# Patient Record
Sex: FEMALE | Race: BLACK OR AFRICAN AMERICAN
[De-identification: names, ages, dates, MRNs, and addresses within clinical notes are randomized per-mention and may not be internally consistent; named-entity substitution may affect disease eponyms.]

---

## 2017-08-27 NOTE — PREOPHP
DATE OF ADMISSION:  2017

 

This patient is coming for a procedure on 2017.

 

HISTORY OF PRESENT ILLNESS:  This is a 57-year-old female,

 2, para 1.  This patient had seen me due to an abnormal

Pap smear that has continuously been abnormal, with the need for

a colposcopy.  The last Pap smear showed ASCUS, atypical squamous

cells of undetermined origin and HPV-positive.  This patient had

been scheduled for a D and C due to endometrial hyperplasia.

This patient has suffered from diabetes and hypertension.  She

has been uncontrolled.  The fact that the ultrasound showed

endometrial hyperplasia is advising me to perform a D and C.  The

endometrium was with no endometrial fluid.  Both ovaries were not

seen due to gas, and the uterus was heterogeneous, with no

measurable fibroids.  She also had a mild infiltration of the

liver with fat.

 

She was diagnosed with an endometrial polyp, and for this reason,

she is undergoing a D and C and LEEP conization.  This patient

has a history of being afraid of procedures, and a LEEP

conization is going to be performed since the need of a D and C

was present, due to postmenopausal bleeding and severe cervical

inflammation.

 

PAST MEDICAL HISTORY:  For tubal ligation, history of asthma,

diabetes, uncontrolled hypertension.

 

ALLERGIES:  SHE IS ALLERGIC TO CODEINE.

 

SHE HAS NO OTHER PERTINENT HISTORY.

 

MEDICATIONS:  She is on:

 

1.   Ibuprofen.

2.   High blood pressure medication.

3.   Dulera.

4.   Albuterol.

5.   Hydrochlorothiazide.

6.   Atorvastatin.

7.   Ventolin.

8.   Sertraline.

9.   __________.

10.  Fluticasone.

 

The patient also has been diagnosed as being bipolar.

 

PHYSICAL EXAMINATION:

VITAL SIGNS:  She weighs 214.  She is 5 feet, 5 inches.  Blood

pressure is 120/80, pulse is 80.

HEAD/NECK:  Normal.

BREASTS:  Soft, nontender, no masses.

CHEST:  Clear.

HEART:  Normal sinus rhythm.

LUNGS:  Normal.

ABDOMEN:  Soft, nontender, no masses.

GENITALIA:  Normal external genitalia.  Cervix with cervicitis.

Uterus normal size, mobile.  Adnexa negative.

RECTAL EXAMINATION:  Normal.

EXTREMITIES:  Normal.

 

DIAGNOSES:

1.   Atypical squamous cells of undetermined significance,

  nonsatisfactory colposcopy, due to severe inflammation and due to

  scar tissue on the cervix, with poor visualization of the

  transformation zone.  She is undergoing a LEEP conization and a

  dilatation and curettage.

2.   She also has diagnoses of diabetes, hypertension, asthma,

chronic smoking and bipolar disease.

 

PLAN:  She has been advised of the possible risks and possible

complications of the procedure with her alternatives and options.

Written information was provided.  She had no more questions and

agreed to go ahead with the procedure, with full understanding

and no more questions, the procedure to be D and C and LEEP

conization.

 

 

 

Dictated By:  Sola Galicia MD

 

/lane/audrey

Job#:  [id number]/Document#:  [id number]

D:  2017 22:17

T:  2017 22:59

## 2017-08-28 ENCOUNTER — HOSPITAL ENCOUNTER (OUTPATIENT)
Dept: HOSPITAL 10 - SDS | Age: 57
Discharge: HOME | End: 2017-08-28
Attending: OBSTETRICS & GYNECOLOGY
Payer: COMMERCIAL

## 2017-08-28 VITALS — HEART RATE: 74 BPM | DIASTOLIC BLOOD PRESSURE: 85 MMHG | RESPIRATION RATE: 28 BRPM | SYSTOLIC BLOOD PRESSURE: 126 MMHG

## 2017-08-28 VITALS — RESPIRATION RATE: 16 BRPM | SYSTOLIC BLOOD PRESSURE: 165 MMHG | HEART RATE: 86 BPM | DIASTOLIC BLOOD PRESSURE: 87 MMHG

## 2017-08-28 VITALS — DIASTOLIC BLOOD PRESSURE: 81 MMHG | SYSTOLIC BLOOD PRESSURE: 151 MMHG | RESPIRATION RATE: 26 BRPM | HEART RATE: 72 BPM

## 2017-08-28 VITALS — SYSTOLIC BLOOD PRESSURE: 152 MMHG | RESPIRATION RATE: 27 BRPM | DIASTOLIC BLOOD PRESSURE: 105 MMHG | HEART RATE: 82 BPM

## 2017-08-28 VITALS — HEART RATE: 74 BPM | RESPIRATION RATE: 16 BRPM | SYSTOLIC BLOOD PRESSURE: 132 MMHG | DIASTOLIC BLOOD PRESSURE: 78 MMHG

## 2017-08-28 VITALS — RESPIRATION RATE: 25 BRPM | HEART RATE: 88 BPM | SYSTOLIC BLOOD PRESSURE: 152 MMHG | DIASTOLIC BLOOD PRESSURE: 70 MMHG

## 2017-08-28 VITALS
HEIGHT: 65 IN | HEIGHT: 65 IN | WEIGHT: 205.47 LBS | BODY MASS INDEX: 34.23 KG/M2 | BODY MASS INDEX: 34.23 KG/M2 | WEIGHT: 205.47 LBS | BODY MASS INDEX: 34.23 KG/M2

## 2017-08-28 VITALS — SYSTOLIC BLOOD PRESSURE: 120 MMHG | RESPIRATION RATE: 18 BRPM | DIASTOLIC BLOOD PRESSURE: 73 MMHG | HEART RATE: 74 BPM

## 2017-08-28 VITALS — HEART RATE: 75 BPM | DIASTOLIC BLOOD PRESSURE: 80 MMHG | SYSTOLIC BLOOD PRESSURE: 109 MMHG | RESPIRATION RATE: 18 BRPM

## 2017-08-28 VITALS — HEART RATE: 72 BPM | DIASTOLIC BLOOD PRESSURE: 82 MMHG | SYSTOLIC BLOOD PRESSURE: 128 MMHG | RESPIRATION RATE: 15 BRPM

## 2017-08-28 VITALS — RESPIRATION RATE: 20 BRPM | SYSTOLIC BLOOD PRESSURE: 140 MMHG | DIASTOLIC BLOOD PRESSURE: 90 MMHG | HEART RATE: 70 BPM

## 2017-08-28 VITALS — HEART RATE: 76 BPM | DIASTOLIC BLOOD PRESSURE: 79 MMHG | SYSTOLIC BLOOD PRESSURE: 133 MMHG | RESPIRATION RATE: 19 BRPM

## 2017-08-28 VITALS — RESPIRATION RATE: 27 BRPM | HEART RATE: 84 BPM | DIASTOLIC BLOOD PRESSURE: 76 MMHG | SYSTOLIC BLOOD PRESSURE: 136 MMHG

## 2017-08-28 DIAGNOSIS — I10: ICD-10-CM

## 2017-08-28 DIAGNOSIS — E11.9: ICD-10-CM

## 2017-08-28 DIAGNOSIS — N71.9: ICD-10-CM

## 2017-08-28 DIAGNOSIS — J45.909: ICD-10-CM

## 2017-08-28 DIAGNOSIS — N88.8: Primary | ICD-10-CM

## 2017-08-28 PROCEDURE — 88305 TISSUE EXAM BY PATHOLOGIST: CPT

## 2017-08-28 PROCEDURE — 82962 GLUCOSE BLOOD TEST: CPT

## 2017-08-28 PROCEDURE — 57522 CONIZATION OF CERVIX: CPT

## 2017-08-28 RX ADMIN — SODIUM CHLORIDE PRN MCG: 9 INJECTION, SOLUTION INTRAVENOUS at 09:18

## 2017-08-28 RX ADMIN — SODIUM CHLORIDE PRN MCG: 9 INJECTION, SOLUTION INTRAVENOUS at 09:23

## 2017-08-28 NOTE — OPR
Date/Time of Note


Date/Time of Note


DATE: 8/28/17 


TIME: 08:48





Operative Report


Procedure Date:  Aug 28, 2017


Preoperative Diagnosis


ASCUS


ABNORMAL PAP SMEAR


NON SATISFACTORY COLPOSCOPY


DIABETES


HTN


ASTHMA


POSTMENOPAUSAL BLEEDING


Postoperative Diagnosis


SAME


Operation Performed


CERVICAL LEEP PROCEDURE


FRACTIONAL D&C


Surgeon:  DEMAR SAVAGE MD


Anesthesia Type:  general


Anesthesiologist:  KALI URIAS MD


Estimated Blood Loss:  none


Transfusion Required:  no


Specimens


ENDOMETRIAL CONTENTS


CERVICAL ECTO AND ENDOCERVICAL BIOPSIES


Grafts/Implants:  none


Complications:  no


Pt Condition Post Procedure:  stable


Disposition:  PACU











DEMAR SAVAGE MD Aug 28, 2017 08:52

## 2017-08-28 NOTE — OPR
DATE OF OPERATION:  08/28/2017

 

PROCEDURE:  Fractional D and C, and LEEP conization of the

cervix.

 

PREOPERATIVE DIAGNOSES:

1.   Nonsatisfactory colposcopy, atypical squamous cells of

  undetermined significance, human papilloma virus positive.

2.   Diabetes.

3.   Hypertension.

4.   Asthma.

5.   Postmenopausal bleeding.

 

POSTOPERATIVE DIAGNOSES:

1.   Nonsatisfactory colposcopy, atypical squamous cells of

  undetermined significance, human papilloma virus positive.

2.   Diabetes.

3.   Hypertension.

4.   Asthma.

5.   Postmenopausal bleeding.

 

SURGEON:  Sola Galicia MD

 

ANESTHESIA:  Dr. __________ with general anesthesia.

 

OPERATIVE PROCEDURE:  The patient was given general anesthesia,

placed in the lithotomy position.  The perineal area was prepped

and draped, and the vaginal speculum was placed.  Examination

under anesthesia revealed that the uterus was small, normal size.

There was marked prolapse of the uterus through the cervix, to be

very close to the introitus at plus 3 station.  The bladder was

also protruding down with a prolapse of the bladder to a grade 3.

The vaginal speculum was applied.  Lugol solution was applied on

the cervix and ectocervix, and endocervical conization was done

with a large and small electrical loop, and the hemostasis was

perfect.  At this time, there has been an ECC done previously,

and an EMC was provided by scraping the layers of the uterus

anteriorly, posteriorly and laterally and in the fundus.  The

patient tolerated the procedure well, and left the OR awake and

stable.  Sponge counts and instrument counts were correct.

Intravenous antibiotics were given for prophylaxis.

 

 

 

Dictated By:  Sola Galicia MD

 

/fnt/ec

Job#:  [id number]/Document#:  [id number]

D:  08/28/2017 08:56

T:  08/28/2017 15:19

## 2017-10-30 ENCOUNTER — HOSPITAL ENCOUNTER (INPATIENT)
Dept: HOSPITAL 10 - REC | Age: 57
LOS: 2 days | Discharge: HOME | DRG: 743 | End: 2017-11-01
Attending: OBSTETRICS & GYNECOLOGY | Admitting: OBSTETRICS & GYNECOLOGY
Payer: COMMERCIAL

## 2017-10-30 VITALS — RESPIRATION RATE: 41 BRPM | SYSTOLIC BLOOD PRESSURE: 98 MMHG | HEART RATE: 70 BPM | DIASTOLIC BLOOD PRESSURE: 62 MMHG

## 2017-10-30 VITALS
WEIGHT: 210.54 LBS | HEIGHT: 65 IN | WEIGHT: 210.54 LBS | HEIGHT: 65 IN | BODY MASS INDEX: 35.08 KG/M2 | BODY MASS INDEX: 35.08 KG/M2

## 2017-10-30 VITALS — SYSTOLIC BLOOD PRESSURE: 107 MMHG | HEART RATE: 72 BPM | DIASTOLIC BLOOD PRESSURE: 57 MMHG | RESPIRATION RATE: 39 BRPM

## 2017-10-30 VITALS — RESPIRATION RATE: 14 BRPM | SYSTOLIC BLOOD PRESSURE: 110 MMHG | DIASTOLIC BLOOD PRESSURE: 83 MMHG | HEART RATE: 87 BPM

## 2017-10-30 VITALS — SYSTOLIC BLOOD PRESSURE: 112 MMHG | RESPIRATION RATE: 17 BRPM | DIASTOLIC BLOOD PRESSURE: 59 MMHG | HEART RATE: 60 BPM

## 2017-10-30 VITALS — DIASTOLIC BLOOD PRESSURE: 65 MMHG | SYSTOLIC BLOOD PRESSURE: 128 MMHG | RESPIRATION RATE: 20 BRPM

## 2017-10-30 VITALS — SYSTOLIC BLOOD PRESSURE: 111 MMHG | DIASTOLIC BLOOD PRESSURE: 61 MMHG | RESPIRATION RATE: 20 BRPM

## 2017-10-30 VITALS — DIASTOLIC BLOOD PRESSURE: 54 MMHG | HEART RATE: 64 BPM | RESPIRATION RATE: 14 BRPM | SYSTOLIC BLOOD PRESSURE: 104 MMHG

## 2017-10-30 VITALS — RESPIRATION RATE: 20 BRPM | DIASTOLIC BLOOD PRESSURE: 79 MMHG | HEART RATE: 70 BPM | SYSTOLIC BLOOD PRESSURE: 131 MMHG

## 2017-10-30 VITALS — RESPIRATION RATE: 44 BRPM | DIASTOLIC BLOOD PRESSURE: 60 MMHG | SYSTOLIC BLOOD PRESSURE: 113 MMHG | HEART RATE: 80 BPM

## 2017-10-30 VITALS — HEART RATE: 66 BPM | SYSTOLIC BLOOD PRESSURE: 113 MMHG | DIASTOLIC BLOOD PRESSURE: 69 MMHG | RESPIRATION RATE: 36 BRPM

## 2017-10-30 VITALS — RESPIRATION RATE: 42 BRPM | SYSTOLIC BLOOD PRESSURE: 116 MMHG | DIASTOLIC BLOOD PRESSURE: 61 MMHG | HEART RATE: 88 BPM

## 2017-10-30 VITALS — RESPIRATION RATE: 34 BRPM | DIASTOLIC BLOOD PRESSURE: 63 MMHG | HEART RATE: 92 BPM | SYSTOLIC BLOOD PRESSURE: 104 MMHG

## 2017-10-30 VITALS — RESPIRATION RATE: 20 BRPM | DIASTOLIC BLOOD PRESSURE: 61 MMHG | SYSTOLIC BLOOD PRESSURE: 111 MMHG

## 2017-10-30 DIAGNOSIS — J45.909: ICD-10-CM

## 2017-10-30 DIAGNOSIS — N85.01: Primary | ICD-10-CM

## 2017-10-30 DIAGNOSIS — F31.9: ICD-10-CM

## 2017-10-30 DIAGNOSIS — N93.9: ICD-10-CM

## 2017-10-30 DIAGNOSIS — E11.9: ICD-10-CM

## 2017-10-30 DIAGNOSIS — I10: ICD-10-CM

## 2017-10-30 DIAGNOSIS — E66.9: ICD-10-CM

## 2017-10-30 DIAGNOSIS — Z72.0: ICD-10-CM

## 2017-10-30 DIAGNOSIS — R10.2: ICD-10-CM

## 2017-10-30 PROCEDURE — 86900 BLOOD TYPING SEROLOGIC ABO: CPT

## 2017-10-30 PROCEDURE — 86920 COMPATIBILITY TEST SPIN: CPT

## 2017-10-30 PROCEDURE — 85025 COMPLETE CBC W/AUTO DIFF WBC: CPT

## 2017-10-30 PROCEDURE — 87086 URINE CULTURE/COLONY COUNT: CPT

## 2017-10-30 PROCEDURE — 94664 DEMO&/EVAL PT USE INHALER: CPT

## 2017-10-30 PROCEDURE — 88307 TISSUE EXAM BY PATHOLOGIST: CPT

## 2017-10-30 PROCEDURE — 86901 BLOOD TYPING SEROLOGIC RH(D): CPT

## 2017-10-30 PROCEDURE — 80051 ELECTROLYTE PANEL: CPT

## 2017-10-30 PROCEDURE — 94640 AIRWAY INHALATION TREATMENT: CPT

## 2017-10-30 PROCEDURE — 86850 RBC ANTIBODY SCREEN: CPT

## 2017-10-30 PROCEDURE — 82962 GLUCOSE BLOOD TEST: CPT

## 2017-10-30 PROCEDURE — 0UT97ZZ RESECTION OF UTERUS, VIA NATURAL OR ARTIFICIAL OPENING: ICD-10-PCS | Performed by: OBSTETRICS & GYNECOLOGY

## 2017-10-30 PROCEDURE — 84520 ASSAY OF UREA NITROGEN: CPT

## 2017-10-30 PROCEDURE — 82565 ASSAY OF CREATININE: CPT

## 2017-10-30 PROCEDURE — 88305 TISSUE EXAM BY PATHOLOGIST: CPT

## 2017-10-30 RX ADMIN — DIPHENHYDRAMINE HYDROCHLORIDE PRN MG: 50 INJECTION, SOLUTION INTRAMUSCULAR; INTRAVENOUS at 22:25

## 2017-10-30 RX ADMIN — CEFAZOLIN SCH MLS/HR: 1 INJECTION, POWDER, FOR SOLUTION INTRAMUSCULAR; INTRAVENOUS at 22:27

## 2017-10-30 RX ADMIN — PYRIDOXINE HYDROCHLORIDE SCH MLS/HR: 100 INJECTION, SOLUTION INTRAMUSCULAR; INTRAVENOUS at 22:29

## 2017-10-30 RX ADMIN — KETOROLAC TROMETHAMINE SCH MG: 30 INJECTION, SOLUTION INTRAMUSCULAR at 22:26

## 2017-10-30 RX ADMIN — ATORVASTATIN CALCIUM SCH MG: 40 TABLET, FILM COATED ORAL at 20:12

## 2017-10-30 RX ADMIN — HYDROCHLOROTHIAZIDE SCH MG: 12.5 CAPSULE ORAL at 21:00

## 2017-10-30 RX ADMIN — MEPERIDINE HYDROCHLORIDE PRN MG: 25 INJECTION, SOLUTION INTRAMUSCULAR; INTRAVENOUS; SUBCUTANEOUS at 12:45

## 2017-10-30 RX ADMIN — PYRIDOXINE HYDROCHLORIDE SCH MLS/HR: 100 INJECTION, SOLUTION INTRAMUSCULAR; INTRAVENOUS at 09:37

## 2017-10-30 RX ADMIN — CEFAZOLIN SCH MLS/HR: 1 INJECTION, POWDER, FOR SOLUTION INTRAMUSCULAR; INTRAVENOUS at 13:41

## 2017-10-30 RX ADMIN — ALBUTEROL SULFATE SCH PUFF: 90 AEROSOL, METERED RESPIRATORY (INHALATION) at 18:23

## 2017-10-30 RX ADMIN — METOCLOPRAMIDE HYDROCHLORIDE SCH MG: 10 TABLET ORAL at 12:59

## 2017-10-30 RX ADMIN — KETOROLAC TROMETHAMINE SCH MG: 30 INJECTION, SOLUTION INTRAMUSCULAR at 16:00

## 2017-10-30 RX ADMIN — KETOROLAC TROMETHAMINE SCH MG: 30 INJECTION, SOLUTION INTRAMUSCULAR at 11:00

## 2017-10-30 RX ADMIN — METOCLOPRAMIDE HYDROCHLORIDE SCH MG: 10 TABLET ORAL at 18:22

## 2017-10-30 RX ADMIN — MEPERIDINE HYDROCHLORIDE PRN MG: 25 INJECTION, SOLUTION INTRAMUSCULAR; INTRAVENOUS; SUBCUTANEOUS at 10:20

## 2017-10-30 RX ADMIN — PYRIDOXINE HYDROCHLORIDE SCH MLS/HR: 100 INJECTION, SOLUTION INTRAMUSCULAR; INTRAVENOUS at 17:37

## 2017-10-30 RX ADMIN — DIPHENHYDRAMINE HYDROCHLORIDE PRN MG: 50 INJECTION, SOLUTION INTRAMUSCULAR; INTRAVENOUS at 10:20

## 2017-10-30 NOTE — SIPON
Date/Time of Note


Date/Time of Note


DATE: 10/30/17 


TIME: 09:46





Operative Report


Preoperative Diagnosis


complex endometrial hyperplasia


diabetes 


hypertension


obesity


asthma


Postoperative Diagnosis


same


Operation/Procedure Performed


vaginal hysterectomy


Surgeon


see signature line


First assist


Rigoberto


Anesthesia:  general


Estimated blood loss:  10 - 50 ml's


Transfusion Required


   none


Specimen


uterus


Grafts/Implants


none


Complications


none











DEMAR SAVAGE MD Oct 30, 2017 09:49

## 2017-10-30 NOTE — PREOPHP
DATE OF ADMISSION: 10/30/2017

 

HISTORY OF PRESENT ILLNESS:  This is a 57-year-old female,  2, para 1.  Patient had been seen
 by me this year due to different problems.  She has been my patient since a year ago.  She has been
 treated for hypertension, asthma, diabetes, bipolar disease and recently heavy bleeding from the ut
erus, even though she had a recent D and C and LEEP conization that revealed that there was no malig
miriam involved, only endometrial hyperplasia with complex endometrium.  This patient has been dealin
g with abnormal Pap smear recently, and with bleeding recently that had been intractable with a D an
d C.  The patient would like to have this problem resolved, complex endometrial hyperplasia, had bee
n diagnosed with diabetes and obesity that adds to the possibility of an endometrial cancer risk.  F
or this reason, the patient has been advised for a vaginal total hysterectomy, possible ANJALI.  The pa
tient will keep her ovaries unless they are with endometriosis or having any problems due to the fac
t that she is still enjoying her wellbeing with the ovaries, being advised by the [employer] t
o remove them after 65 years of age and not before unless they have a problem.

 

PAST MEDICAL HISTORY:  She had a tubal ligation, asthma, diabetes, uncontrolled hypertension.

 

ALLERGIES:  CODEINE.

 

MEDICATIONS:  She is on:

1.  Ibuprofen.

2.  High blood pressure medication.

3.  Dulera.

4.  Albuterol.

5.  Hydrochlorothiazide.

6.  Atorvastatin.

7.  Ventolin. 

8.  Sertraline.

9.  Fluticasone.  

 

FAMILY HISTORY:   For hypertension and diabetes.  

 

SHE IS ALLERGIC TO CODEINE and she had a tubal ligation in .

 

PHYSICAL EXAMINATION:

GENERAL APPEARANCE:  Good.  

VITAL SIGNS:  She weighs 209.  She is 5 feet 5 inches.  She is afebrile, pulse is 80, respirations 1
6, blood pressure 120/80 right now.

HEAD AND NECK:  Normal.

BREASTS:  Soft, nontender, no masses.

CHEST:  Clear.

HEART:  Normal sinus rhythm.

LUNGS:  Clear.

ABDOMEN:  Soft, nontender, no masses.

GENITALIA:  With normal external genitalia.  Cervix with some cervicitis.  Uterus normal size, mobil
e, nonprolapsed.  Adnexa are negative.

EXTREMITIES:  Normal.

 

DIAGNOSES:

1.  Complex endometrial hyperplasia.

2.  Abnormal uterine bleeding.

3.  Postmenopausal pelvic pain.

4.  Diabetes.

5.  Obesity.

6.  Chronic smoker.

7.  Bipolar.

8.  Asthma. 

 

PLAN:  She is undergoing a vaginal hysterectomy, possible ANJALI, possible BSO.  She has been advised o
f the possible risks and possible complications of the procedure with her alternatives and options. 
 Written information was provided.  She had no more questions and agreed to go ahead with the proced
ure, with full understanding and no more questions.

 

 

Dictated By: DEMAR SANTIAGO/DEION

DD:    10/29/2017 20:21:13

DT:    10/30/2017 01:24:07

Conf#: [id number]

DID#:  [id number]

## 2017-10-30 NOTE — OPR
DATE OF OPERATION:  10/30/2017

 

 

PROCEDURE:  Vaginal total hysterectomy.

 

PREOPERATIVE DIAGNOSES:  Complex endometrial hyperplasia, diabetes, obesity, asthma, hypertension.  

 

POSTOPERATIVE DIAGNOSIS:  Endometrial hyperplasia, diabetes, obesity, asthma, hypertension.  

 

SURGEON:  Dr. Galicia

 

ASSISTANT:  Dr. Franklin

 

ANESTHESIA:  General.

 

DESCRIPTION OF PROCEDURE:  The patient was given general anesthesia, placed in the lithotomy positio
n.  The perineal and vaginal area were prepped and draped, and the examination under anesthesia reve
aled that there was marked relaxation of the bladder and rectum with a known prolapsed uterus.  The 
patient had a previous  section with 1 child that she had.  The uterus was normal size.  Adn
exa were nonpalpable.  The vaginal speculum was applied.  The cervix was held with the Brendan clamp a
nd an injection of Xylocaine and epinephrine was given around the cervicovaginal junction.  An incis
ion was done at the cervicovaginal junction at the round area and anterior cul-de-sac was found and 
the posterior cul-de-sac was found.  The cardinal ligaments and uterosacral ligaments, the uterine v
essels were burned with bipolar instrument LigaSure under 3 green levels.  

 

At this time, the uterus was cut after inverting the uterus and clamping the ovarian ligament and tu
be in both sides.  The adnexal pedicles were tied with double sutures with #1 Vicryl and these sutur
es were held.  The cardinal ligaments were sutured and also these stitches were held.  The visualiza
tion of the tubes and ovaries were not seen.  The ovary was not even palpated very high up in the pe
lvis and also the tubes were at the same time not even approaching the cavity.  At this time, a purs
estring suture was done around the peritoneum and the cavity was closed.  The adnexal pedicles and c
ardinal ligament pedicles were tied to ipsilateral side and the stitches were brought up to the ante
rior and posterior area of the vaginal cuff.  The lifting of the vaginal cuff was observed, after ty
ing the adnexal pedicle, the vagina was closed vertically with interrupted sutures with #1 Vicryl.  
Hemostasis was good.  Blood loss was minimal.  The urine was clear at the end of the procedure, a Fo
saul catheter was applied and also a Xeroform gauze was left in the vagina as a dressing.  The patien
t tolerated the procedure well and left the OR awake and stable.  Sponge counts, instrument counts, 
needle counts again were correct.  Intravenous antibiotics were given for prophylaxis.  Blood loss w
as minimal.

 

 

Dictated By: DEMAR SANTIAGO/DEION

DD:    10/30/2017 10:44:23

DT:    10/30/2017 11:19:15

Conf#: [id number]

DID#:  [id number]

## 2017-10-30 NOTE — OPR
DATE OF OPERATION:  10/30/2017

 

 

PROCEDURE:  Vaginal total hysterectomy.

 

PREOPERATIVE DIAGNOSES:  Complex endometrial hyperplasia, diabetes, obesity, asthma, hypertension.  

 

POSTOPERATIVE DIAGNOSIS:  Endometrial hyperplasia, diabetes, obesity, asthma, hypertension.  

 

SURGEON:  Dr. Galicia

 

ASSISTANT:  Dr. Franklin

 

ANESTHESIA:  General.

 

DESCRIPTION OF PROCEDURE:  The patient was given general anesthesia, placed in the lithotomy positio
n.  The perineal and vaginal area were prepped and draped, and the examination under anesthesia reve
aled that there was marked relaxation of the bladder and rectum with a known prolapsed uterus.  The 
patient had a previous  section with 1 child that she had.  The uterus was normal size.  Adn
exa were nonpalpable.  The vaginal speculum was applied.  The cervix was held with the Brendan clamp a
nd an injection of Xylocaine and epinephrine was given around the cervicovaginal junction.  An incis
ion was done at the cervicovaginal junction at the round area and anterior cul-de-sac was found and 
the posterior cul-de-sac was found.  The cardinal ligaments and uterosacral ligaments, the uterine v
essels were burned with bipolar instrument LigaSure under 3 green levels.  

 

At this time, the uterus was cut after inverting the uterus and clamping the ovarian ligament and tu
be in both sides.  The adnexal pedicles were tied with double sutures with #1 Vicryl and these sutur
es were held.  The cardinal ligaments were sutured and also these stitches were held.  The visualiza
tion of the tubes and ovaries were not seen.  The ovary was not even palpated very high up in the pe
lvis and also the tubes were at the same time not even approaching the cavity.  At this time, a purs
estring suture was done around the peritoneum and the cavity was closed.  The adnexal pedicles and c
ardinal ligament pedicles were tied to ipsilateral side and the stitches were brought up to the ante
rior and posterior area of the vaginal cuff.  The lifting of the vaginal cuff was observed, after ty
ing the adnexal pedicle, the vagina was closed vertically with interrupted sutures with #1 Vicryl.  
Hemostasis was good.  Blood loss was minimal.  The urine was clear at the end of the procedure, a Fo
saul catheter was applied and also a Xeroform gauze was left in the vagina as a dressing.  The patien
t tolerated the procedure well and left the OR awake and stable.  Sponge counts, instrument counts, 
needle counts again were correct.  Intravenous antibiotics were given for prophylaxis.  Blood loss w
as minimal.

 

 

Dictated By: DEMAR SANTIAGO/DEION

DD:    10/30/2017 10:44:23

DT:    10/30/2017 11:19:15

Conf#: [id number]

DID#:  [id number]

## 2017-10-30 NOTE — PREOPHP
DATE OF ADMISSION: 10/30/2017

 

HISTORY OF PRESENT ILLNESS:  This is a 57-year-old female,  2, para 1.  Patient had been seen
 by me this year due to different problems.  She has been my patient since a year ago.  She has been
 treated for hypertension, asthma, diabetes, bipolar disease and recently heavy bleeding from the ut
erus, even though she had a recent D and C and LEEP conization that revealed that there was no malig
miriam involved, only endometrial hyperplasia with complex endometrium.  This patient has been dealin
g with abnormal Pap smear recently, and with bleeding recently that had been intractable with a D an
d C.  The patient would like to have this problem resolved, complex endometrial hyperplasia, had bee
n diagnosed with diabetes and obesity that adds to the possibility of an endometrial cancer risk.  F
or this reason, the patient has been advised for a vaginal total hysterectomy, possible ANJALI.  The pa
tient will keep her ovaries unless they are with endometriosis or having any problems due to the fac
t that she is still enjoying her wellbeing with the ovaries, being advised by the [employer] t
o remove them after 65 years of age and not before unless they have a problem.

 

PAST MEDICAL HISTORY:  She had a tubal ligation, asthma, diabetes, uncontrolled hypertension.

 

ALLERGIES:  CODEINE.

 

MEDICATIONS:  She is on:

1.  Ibuprofen.

2.  High blood pressure medication.

3.  Dulera.

4.  Albuterol.

5.  Hydrochlorothiazide.

6.  Atorvastatin.

7.  Ventolin. 

8.  Sertraline.

9.  Fluticasone.  

 

FAMILY HISTORY:   For hypertension and diabetes.  

 

SHE IS ALLERGIC TO CODEINE and she had a tubal ligation in .

 

PHYSICAL EXAMINATION:

GENERAL APPEARANCE:  Good.  

VITAL SIGNS:  She weighs 209.  She is 5 feet 5 inches.  She is afebrile, pulse is 80, respirations 1
6, blood pressure 120/80 right now.

HEAD AND NECK:  Normal.

BREASTS:  Soft, nontender, no masses.

CHEST:  Clear.

HEART:  Normal sinus rhythm.

LUNGS:  Clear.

ABDOMEN:  Soft, nontender, no masses.

GENITALIA:  With normal external genitalia.  Cervix with some cervicitis.  Uterus normal size, mobil
e, nonprolapsed.  Adnexa are negative.

EXTREMITIES:  Normal.

 

DIAGNOSES:

1.  Complex endometrial hyperplasia.

2.  Abnormal uterine bleeding.

3.  Postmenopausal pelvic pain.

4.  Diabetes.

5.  Obesity.

6.  Chronic smoker.

7.  Bipolar.

8.  Asthma. 

 

PLAN:  She is undergoing a vaginal hysterectomy, possible ANJALI, possible BSO.  She has been advised o
f the possible risks and possible complications of the procedure with her alternatives and options. 
 Written information was provided.  She had no more questions and agreed to go ahead with the proced
ure, with full understanding and no more questions.

 

 

Dictated By: DEMAR SANTIAGO/DEION

DD:    10/29/2017 20:21:13

DT:    10/30/2017 01:24:07

Conf#: [id number]

DID#:  [id number]

## 2017-10-31 VITALS — SYSTOLIC BLOOD PRESSURE: 105 MMHG | DIASTOLIC BLOOD PRESSURE: 64 MMHG | RESPIRATION RATE: 16 BRPM

## 2017-10-31 VITALS — DIASTOLIC BLOOD PRESSURE: 65 MMHG | RESPIRATION RATE: 20 BRPM | SYSTOLIC BLOOD PRESSURE: 118 MMHG

## 2017-10-31 VITALS — RESPIRATION RATE: 20 BRPM | DIASTOLIC BLOOD PRESSURE: 60 MMHG | SYSTOLIC BLOOD PRESSURE: 115 MMHG

## 2017-10-31 VITALS — DIASTOLIC BLOOD PRESSURE: 57 MMHG | SYSTOLIC BLOOD PRESSURE: 110 MMHG | RESPIRATION RATE: 20 BRPM

## 2017-10-31 RX ADMIN — ALBUTEROL SULFATE SCH PUFF: 90 AEROSOL, METERED RESPIRATORY (INHALATION) at 17:28

## 2017-10-31 RX ADMIN — METOCLOPRAMIDE HYDROCHLORIDE SCH MG: 10 TABLET ORAL at 17:28

## 2017-10-31 RX ADMIN — KETOROLAC TROMETHAMINE SCH MG: 30 INJECTION, SOLUTION INTRAMUSCULAR at 04:42

## 2017-10-31 RX ADMIN — PYRIDOXINE HYDROCHLORIDE SCH MLS/HR: 100 INJECTION, SOLUTION INTRAMUSCULAR; INTRAVENOUS at 01:37

## 2017-10-31 RX ADMIN — METOCLOPRAMIDE HYDROCHLORIDE SCH MG: 10 TABLET ORAL at 00:19

## 2017-10-31 RX ADMIN — ALBUTEROL SULFATE SCH PUFF: 90 AEROSOL, METERED RESPIRATORY (INHALATION) at 00:00

## 2017-10-31 RX ADMIN — METOCLOPRAMIDE HYDROCHLORIDE SCH MG: 10 TABLET ORAL at 05:38

## 2017-10-31 RX ADMIN — ALBUTEROL SULFATE SCH PUFF: 90 AEROSOL, METERED RESPIRATORY (INHALATION) at 11:43

## 2017-10-31 RX ADMIN — KETOROLAC TROMETHAMINE SCH MG: 30 INJECTION, SOLUTION INTRAMUSCULAR at 17:27

## 2017-10-31 RX ADMIN — CEFAZOLIN SCH MLS/HR: 1 INJECTION, POWDER, FOR SOLUTION INTRAMUSCULAR; INTRAVENOUS at 05:39

## 2017-10-31 RX ADMIN — LOSARTAN POTASSIUM SCH MG: 25 TABLET, FILM COATED ORAL at 10:04

## 2017-10-31 RX ADMIN — METOCLOPRAMIDE HYDROCHLORIDE SCH MG: 10 TABLET ORAL at 11:21

## 2017-10-31 RX ADMIN — ALBUTEROL SULFATE SCH PUFF: 90 AEROSOL, METERED RESPIRATORY (INHALATION) at 05:37

## 2017-10-31 RX ADMIN — AMLODIPINE BESYLATE SCH MG: 10 TABLET ORAL at 11:21

## 2017-10-31 RX ADMIN — VITAMIN D, TAB 1000IU (100/BT) SCH UNIT: 25 TAB at 10:07

## 2017-10-31 RX ADMIN — HYDROCHLOROTHIAZIDE SCH MG: 12.5 CAPSULE ORAL at 21:29

## 2017-10-31 RX ADMIN — ATORVASTATIN CALCIUM SCH MG: 40 TABLET, FILM COATED ORAL at 21:28

## 2017-10-31 RX ADMIN — HYDROCHLOROTHIAZIDE SCH MG: 12.5 CAPSULE ORAL at 10:05

## 2017-10-31 RX ADMIN — KETOROLAC TROMETHAMINE SCH MG: 30 INJECTION, SOLUTION INTRAMUSCULAR at 11:43

## 2017-10-31 NOTE — PN
Date/Time of Note


Date/Time of Note


DATE: 10/31/17 


TIME: 10:50





Assessment/Plan


Lines/Catheters


IV Catheter Type (from Nrsg):  Peripheral IV


Schwartz in Place (from Nrsg):  Yes





Subjective


24 Hr Interval Summary


FEELS GOOD, VAGINAL DRESSING REMOVED DRY.


ENCOURAGED AMBULATION


NOT IN PAIN


 OPERATION PROCEDURE EXPLAINED


PATIENT IS GRATEFUL


Constitutional:  BM, ambulates, flatus, improved, no complaints, urine output


Feeding:  advancing diet


Pain Control:  well controlled





Exam/Review of Systems


Vital Signs


Vitals





 Vital Signs








  Date Time  Temp Pulse Resp B/P Pulse Ox O2 Delivery O2 Flow Rate FiO2


 


10/31/17 08:42  68 16  99   21


 


10/31/17 08:20 97.7   105/64    


 


10/31/17 06:22       2.0 


 


10/30/17 20:55      Nasal Cannula  














 Intake and Output   


 


 10/30/17 10/30/17 10/31/17





 15:00 23:00 07:00


 


Intake Total 3050 ml 2080 ml 2330 ml


 


Output Total 175 ml 1000 ml 2300 ml


 


Balance 2875 ml 1080 ml 30 ml











Exam


Constitutional:  alert, oriented, well developed


Psych:  nl mood/affect, no complaints


Head:  atraumatic, normocephalic


Eyes:  EOMI, nl conjunctiva, nl lids, nl sclera


ENMT:  mucosa pink and moist, nl external ears & nose, nl lips & teeth, nl 

nasal mucosa & septum


Neck:  non-tender, supple


Respiratory:  clear to auscultation, normal air movement


Cardiovascular:  nl pulses, regular rate and rhythm


Gastrointestinal:  nl liver, spleen, non-tender, soft


Musculoskeletal:  nl extremities to inspection, nl gait and stance


Extremities:  normal pulses


Neurological:  CNS II-XII intact, nl mental status, nl speech, nl strength


Skin:  nl turgor, rash or lesions


Lymph:  nl lymph nodes





Results


Result Diagram:  


10/31/17 0518                                                                  

              10/31/17 0518














DEMAR SAVAGE MD Oct 31, 2017 10:52

## 2017-11-01 VITALS — DIASTOLIC BLOOD PRESSURE: 69 MMHG | RESPIRATION RATE: 16 BRPM | SYSTOLIC BLOOD PRESSURE: 121 MMHG

## 2017-11-01 VITALS — RESPIRATION RATE: 20 BRPM | SYSTOLIC BLOOD PRESSURE: 130 MMHG | DIASTOLIC BLOOD PRESSURE: 68 MMHG

## 2017-11-01 RX ADMIN — LOSARTAN POTASSIUM SCH MG: 25 TABLET, FILM COATED ORAL at 09:27

## 2017-11-01 RX ADMIN — HYDROCHLOROTHIAZIDE SCH MG: 12.5 CAPSULE ORAL at 09:26

## 2017-11-01 RX ADMIN — AMLODIPINE BESYLATE SCH MG: 10 TABLET ORAL at 09:26

## 2017-11-01 RX ADMIN — ALBUTEROL SULFATE SCH PUFF: 90 AEROSOL, METERED RESPIRATORY (INHALATION) at 06:25

## 2017-11-01 RX ADMIN — METOCLOPRAMIDE HYDROCHLORIDE SCH MG: 10 TABLET ORAL at 00:19

## 2017-11-01 RX ADMIN — VITAMIN D, TAB 1000IU (100/BT) SCH UNIT: 25 TAB at 09:25

## 2017-11-01 RX ADMIN — METOCLOPRAMIDE HYDROCHLORIDE SCH MG: 10 TABLET ORAL at 06:25

## 2017-11-01 RX ADMIN — ALBUTEROL SULFATE SCH PUFF: 90 AEROSOL, METERED RESPIRATORY (INHALATION) at 00:20

## 2017-11-01 NOTE — PD.PPDC
OB/GYN Discharge Instruction


Condition


Patient Condition:  Good





Diet


Diet:  Resume Regular Diet





Activity/Restrictions








Activity:   Normal Activity





 May Shower














Restrictions:   No Exercising





 No Lifting





 No Driving





 No Sexual Activity





 Nothing in the Vagina





 No Lely





 No Tampons, douche











Follow-up


Follow-up with Physician:  2, Week/Weeks





Return to clinic for








GYN Instructions:   Fever greater than 101





 Chills





 Worsening abdominal pain





 Excessive Vaginal Bleeding





 More than 2 pads per hour





 Unable to tolerate diet

















DEMAR SAVAGE MD Nov 1, 2017 10:34

## 2017-11-01 NOTE — PD.PPDC
OB/GYN Discharge Instruction


Condition


Patient Condition:  Good





Diet


Diet:  Resume Regular Diet





Activity/Restrictions








Activity:   Normal Activity





 May Shower














Restrictions:   No Exercising





 No Lifting





 No Driving





 No Sexual Activity





 Nothing in the Vagina





 No Ragan





 No Tampons, douche











Follow-up


Follow-up with Physician:  2, Week/Weeks





Return to clinic for








GYN Instructions:   Fever greater than 101





 Chills





 Worsening abdominal pain





 Excessive Vaginal Bleeding





 More than 2 pads per hour





 Unable to tolerate diet

















DEMAR SAVAGE MD Nov 1, 2017 10:34

## 2017-11-02 NOTE — DS
DATE OF ADMISSION: 10/30/2017

DATE OF DISCHARGE: 2017

 

HISTORY:  This is a 57-year-old female,  2, para 1.  The patient has seen me for hypertension
, asthma, diabetes, bipolar.  She had recently been bleeding.  She had a recent D and C and a LEEP c
onization that revealed that there was no malignancy involved, but there was some endometrial hyperp
lasia with complex endometrium.  This patient was advised for a hysterectomy since she was having an
 abnormal Pap smears as well and to determine that if the endometrial hyperplasia would be hiding po
ssibility of an endometrial cancer.  For this reason, she was advised for a vaginal hysterectomy, po
ssible ANJALI.  She was placed in the surgical department and she had a vaginal hysterectomy.  Both ova
cecily were nonpalpable.  She had a tubal ligation in .  We did not feel for the tubes or we did n
ot see the tubes and we did not feel the ovaries as well while doing a vaginal hysterectomy.  The pa
tient is with hypertension and diabetes and obesity.  For this reason, this route was chosen.  She d
id very well after surgery.  She had normal laboratory testing with mild anemia that was non-symptom
atic to the patient.  She was afebrile.  She was ambulatory.  The second postoperative day was lakisha
ating diet and with bowel movement and no active bleeding, feeling very well and ambulatory and aski
ng to go home.  The patient's pathology report came up with the possibility of endometrioid carcinom
a and the specimen was sent to _____ for a second opinion.  The patient was doing well and she is di
scharged home on her second postoperative day on ibuprofen and Vicodin p.r.n.  She was supposed to s
ee me in the office in a week and she was advised that we need to wait for pathology results to see 
if we need to make a consultation for oncology.  The patient is stable and in good condition to go h
ome with normal blood sugar level and normal chemistry.  Further instructions were given for how to 
take care of herself and she was advised to see me in the office if she had any problems or see me i
n a week for followup.

 

 

Dictated By: DEMAR SANTIAGO/DEION

DD:    2017 10:51:02

DT:    2017 05:26:07

Conf#: [id number]

DID#:  [id number]

## 2017-11-02 NOTE — DS
DATE OF ADMISSION: 10/30/2017

DATE OF DISCHARGE: 2017

 

HISTORY:  This is a 57-year-old female,  2, para 1.  The patient has seen me for hypertension
, asthma, diabetes, bipolar.  She had recently been bleeding.  She had a recent D and C and a LEEP c
onization that revealed that there was no malignancy involved, but there was some endometrial hyperp
lasia with complex endometrium.  This patient was advised for a hysterectomy since she was having an
 abnormal Pap smears as well and to determine that if the endometrial hyperplasia would be hiding po
ssibility of an endometrial cancer.  For this reason, she was advised for a vaginal hysterectomy, po
ssible ANJALI.  She was placed in the surgical department and she had a vaginal hysterectomy.  Both ova
cecily were nonpalpable.  She had a tubal ligation in .  We did not feel for the tubes or we did n
ot see the tubes and we did not feel the ovaries as well while doing a vaginal hysterectomy.  The pa
tient is with hypertension and diabetes and obesity.  For this reason, this route was chosen.  She d
id very well after surgery.  She had normal laboratory testing with mild anemia that was non-symptom
atic to the patient.  She was afebrile.  She was ambulatory.  The second postoperative day was lakisha
ating diet and with bowel movement and no active bleeding, feeling very well and ambulatory and aski
ng to go home.  The patient's pathology report came up with the possibility of endometrioid carcinom
a and the specimen was sent to _____ for a second opinion.  The patient was doing well and she is di
scharged home on her second postoperative day on ibuprofen and Vicodin p.r.n.  She was supposed to s
ee me in the office in a week and she was advised that we need to wait for pathology results to see 
if we need to make a consultation for oncology.  The patient is stable and in good condition to go h
ome with normal blood sugar level and normal chemistry.  Further instructions were given for how to 
take care of herself and she was advised to see me in the office if she had any problems or see me i
n a week for followup.

 

 

Dictated By: DEMAR SANTIAGO/DEION

DD:    2017 10:51:02

DT:    2017 05:26:07

Conf#: [id number]

DID#:  [id number]

## 2018-11-15 NOTE — HPN
Date/Time of Note


Date/Time of Note


DATE: 8/28/17 


TIME: 08:43





Interval H&P Admission Note


Pt. seen H&P reviewed:  No system changes











DEMAR SAVAGE MD Aug 28, 2017 08:44 No

## 2019-09-13 ENCOUNTER — HOSPITAL ENCOUNTER (INPATIENT)
Dept: HOSPITAL 54 - TELE | Age: 59
LOS: 3 days | Discharge: HOME | DRG: 141 | End: 2019-09-16
Attending: NURSE PRACTITIONER | Admitting: NURSE PRACTITIONER
Payer: COMMERCIAL

## 2019-09-13 VITALS — WEIGHT: 241 LBS | BODY MASS INDEX: 40.15 KG/M2 | HEIGHT: 65 IN

## 2019-09-13 VITALS — SYSTOLIC BLOOD PRESSURE: 145 MMHG | DIASTOLIC BLOOD PRESSURE: 88 MMHG

## 2019-09-13 VITALS — DIASTOLIC BLOOD PRESSURE: 95 MMHG | SYSTOLIC BLOOD PRESSURE: 158 MMHG

## 2019-09-13 DIAGNOSIS — Z79.899: ICD-10-CM

## 2019-09-13 DIAGNOSIS — E78.5: ICD-10-CM

## 2019-09-13 DIAGNOSIS — Z79.51: ICD-10-CM

## 2019-09-13 DIAGNOSIS — Z80.1: ICD-10-CM

## 2019-09-13 DIAGNOSIS — E11.9: ICD-10-CM

## 2019-09-13 DIAGNOSIS — Z85.42: ICD-10-CM

## 2019-09-13 DIAGNOSIS — J20.9: ICD-10-CM

## 2019-09-13 DIAGNOSIS — E05.90: ICD-10-CM

## 2019-09-13 DIAGNOSIS — I10: ICD-10-CM

## 2019-09-13 DIAGNOSIS — Z72.0: ICD-10-CM

## 2019-09-13 DIAGNOSIS — Z90.710: ICD-10-CM

## 2019-09-13 DIAGNOSIS — J45.901: Primary | ICD-10-CM

## 2019-09-13 DIAGNOSIS — N17.0: ICD-10-CM

## 2019-09-13 DIAGNOSIS — E44.0: ICD-10-CM

## 2019-09-13 PROCEDURE — A4623 TRACHEOSTOMY INNER CANNULA: HCPCS

## 2019-09-13 PROCEDURE — G0378 HOSPITAL OBSERVATION PER HR: HCPCS

## 2019-09-13 RX ADMIN — Medication PRN EACH: at 21:17

## 2019-09-13 RX ADMIN — DEXTROSE MONOHYDRATE SCH MLS/HR: 50 INJECTION, SOLUTION INTRAVENOUS at 20:16

## 2019-09-13 RX ADMIN — Medication SCH MG: at 21:15

## 2019-09-13 RX ADMIN — Medication SCH EACH: at 22:29

## 2019-09-13 RX ADMIN — ALBUTEROL SULFATE SCH MG: 2.5 SOLUTION RESPIRATORY (INHALATION) at 21:15

## 2019-09-13 NOTE — NUR
MS RN OPENING NOTES



Received patient, awake on Valenzuela's position on bed. Admission routine done. Initial skin 
assessment done, patient claimed she has not skin issues identified, refused skin assessment 
at this time. Patient preferred to kept her pants on. Admission orders noted and carried 
out. IVF initiated as ordered. On O2 inhalation with SOB and dyspnea on exertion noted, 
saturating well. No complaints of pain made at this time. Kept on high-Valenzuela's position on 
bed, call light within easy reach. Kept bed low and locked. On fall precautions. Provided 
BSC. Instructed patient on energy conservation. Will continue to monitor accordingly.

## 2019-09-13 NOTE — NUR
MS RN NOTES



BS - 175mg/dl. Patient had eaten snacks 1 hr ago. Per patient, she does not take any 
insulin. Educated the patient the indication of this medication, benefits and possible S/E. 
Patient verbalized understanding, but insisted to refused meds. Instructed patient that 
another blood sugar check is scheduled before breakfast. Will continue to monitor the 
patient accordingly.

## 2019-09-13 NOTE — NUR
TELE RN ADMITTING NOTES



RECEIVED PATIENT DIRECT ADMIT FROM [facility] ER VIA GURNEY ACCOMPANIED BY 2 EMT'S. 
ALERT AND ORIENTED X 4; APPEARS SHORT OF BREATH WITH RESPIRATIONS AT 20-24cpm. VERBALLY 
RESPONSIVE AND ABLE TO FOLLOW DIRECTIONS. ON OXYGEN AT 2L/MIN VIA NASAL CANNULA. VITAL SIGNS 
TAKEN. MD MADE AWARE OF ADMISSION. PLACED ON HIGH FOWLERS/SITTING POSITION. NO COMPLAINTS OF 
PAIN/DISCOMFORT AS OF THE TIME. CALL LIGHT IN REACH. NEEDS ATTENDED. WILL ENDORSE TO NOC 
SHIFT FOR CONTINUITY OF CARE.

## 2019-09-13 NOTE — NUR
MS RN NOTES



MRSA specimen collected, source: LUCERO rinaldi. Called Lab for , spoke to  Elke. 
Placed in specimen storage accordingly.

## 2019-09-14 VITALS — DIASTOLIC BLOOD PRESSURE: 66 MMHG | SYSTOLIC BLOOD PRESSURE: 123 MMHG

## 2019-09-14 VITALS — SYSTOLIC BLOOD PRESSURE: 119 MMHG | DIASTOLIC BLOOD PRESSURE: 69 MMHG

## 2019-09-14 VITALS — SYSTOLIC BLOOD PRESSURE: 145 MMHG | DIASTOLIC BLOOD PRESSURE: 83 MMHG

## 2019-09-14 VITALS — DIASTOLIC BLOOD PRESSURE: 78 MMHG | SYSTOLIC BLOOD PRESSURE: 130 MMHG

## 2019-09-14 LAB
ALBUMIN SERPL BCP-MCNC: 3 G/DL (ref 3.4–5)
ALP SERPL-CCNC: 74 U/L (ref 46–116)
ALT SERPL W P-5'-P-CCNC: 11 U/L (ref 12–78)
AST SERPL W P-5'-P-CCNC: 8 U/L (ref 15–37)
BASOPHILS # BLD AUTO: 0 /CMM (ref 0–0.2)
BASOPHILS NFR BLD AUTO: 0.2 % (ref 0–2)
BILIRUB SERPL-MCNC: 0.2 MG/DL (ref 0.2–1)
BUN SERPL-MCNC: 10 MG/DL (ref 7–18)
CALCIUM SERPL-MCNC: 8.6 MG/DL (ref 8.5–10.1)
CHLORIDE SERPL-SCNC: 106 MMOL/L (ref 98–107)
CHOLEST SERPL-MCNC: 184 MG/DL (ref ?–200)
CO2 SERPL-SCNC: 26 MMOL/L (ref 21–32)
CREAT SERPL-MCNC: 0.9 MG/DL (ref 0.6–1.3)
EOSINOPHIL NFR BLD AUTO: 0 % (ref 0–6)
GLUCOSE SERPL-MCNC: 127 MG/DL (ref 74–106)
HCT VFR BLD AUTO: 38 % (ref 33–45)
HDLC SERPL-MCNC: 57 MG/DL (ref 40–60)
HGB BLD-MCNC: 12.5 G/DL (ref 11.5–14.8)
LDLC SERPL DIRECT ASSAY-MCNC: 108 MG/DL (ref 0–99)
LYMPHOCYTES NFR BLD AUTO: 0.9 /CMM (ref 0.8–4.8)
LYMPHOCYTES NFR BLD AUTO: 8.5 % (ref 20–44)
MAGNESIUM SERPL-MCNC: 1.9 MG/DL (ref 1.8–2.4)
MCHC RBC AUTO-ENTMCNC: 33 G/DL (ref 31–36)
MCV RBC AUTO: 91 FL (ref 82–100)
MONOCYTES NFR BLD AUTO: 0.3 /CMM (ref 0.1–1.3)
MONOCYTES NFR BLD AUTO: 2.7 % (ref 2–12)
NEUTROPHILS # BLD AUTO: 9.3 /CMM (ref 1.8–8.9)
NEUTROPHILS NFR BLD AUTO: 88.6 % (ref 43–81)
PHOSPHATE SERPL-MCNC: 2.8 MG/DL (ref 2.5–4.9)
PLATELET # BLD AUTO: 317 /CMM (ref 150–450)
POTASSIUM SERPL-SCNC: 4 MMOL/L (ref 3.5–5.1)
PROT SERPL-MCNC: 7.3 G/DL (ref 6.4–8.2)
RBC # BLD AUTO: 4.13 MIL/UL (ref 4–5.2)
SODIUM SERPL-SCNC: 142 MMOL/L (ref 136–145)
TRIGL SERPL-MCNC: 94 MG/DL (ref 30–150)
TSH SERPL DL<=0.005 MIU/L-ACNC: 0.25 UIU/ML (ref 0.36–3.74)
WBC NRBC COR # BLD AUTO: 10.4 K/UL (ref 4.3–11)

## 2019-09-14 RX ADMIN — Medication SCH MG: at 13:30

## 2019-09-14 RX ADMIN — AMLODIPINE BESYLATE SCH MG: 10 TABLET ORAL at 09:30

## 2019-09-14 RX ADMIN — DEXTROSE MONOHYDRATE SCH MLS/HR: 50 INJECTION, SOLUTION INTRAVENOUS at 20:00

## 2019-09-14 RX ADMIN — FLUTICASONE PROPIONATE SCH SPRAY: 50 SPRAY, METERED NASAL at 17:11

## 2019-09-14 RX ADMIN — INSULIN HUMAN PRN UNIT: 100 INJECTION, SOLUTION PARENTERAL at 11:56

## 2019-09-14 RX ADMIN — Medication SCH EACH: at 11:44

## 2019-09-14 RX ADMIN — LOSARTAN POTASSIUM SCH MG: 25 TABLET, FILM COATED ORAL at 09:30

## 2019-09-14 RX ADMIN — Medication SCH EACH: at 22:19

## 2019-09-14 RX ADMIN — Medication SCH MG: at 07:35

## 2019-09-14 RX ADMIN — Medication SCH EACH: at 06:36

## 2019-09-14 RX ADMIN — FLUTICASONE PROPIONATE SCH SPRAY: 50 SPRAY, METERED NASAL at 09:29

## 2019-09-14 RX ADMIN — ALBUTEROL SULFATE SCH MG: 2.5 SOLUTION RESPIRATORY (INHALATION) at 07:35

## 2019-09-14 RX ADMIN — INSULIN HUMAN PRN UNIT: 100 INJECTION, SOLUTION PARENTERAL at 22:21

## 2019-09-14 RX ADMIN — Medication SCH MG: at 19:30

## 2019-09-14 RX ADMIN — ATORVASTATIN CALCIUM SCH MG: 40 TABLET, FILM COATED ORAL at 17:11

## 2019-09-14 RX ADMIN — ALBUTEROL SULFATE SCH MG: 2.5 SOLUTION RESPIRATORY (INHALATION) at 13:30

## 2019-09-14 RX ADMIN — INSULIN HUMAN PRN UNIT: 100 INJECTION, SOLUTION PARENTERAL at 16:47

## 2019-09-14 RX ADMIN — ALBUTEROL SULFATE SCH MG: 2.5 SOLUTION RESPIRATORY (INHALATION) at 19:30

## 2019-09-14 RX ADMIN — Medication SCH EACH: at 16:36

## 2019-09-14 NOTE — NUR
MS RN CLOSING NOTES



Patient awake, on Valenzuela's position on bed. With minimal labored breathing, on O2 via NC @ 
2LPM, saturating well, no respiratory distress noted. All nursing needs attended, afebrile 
the whole shift. No new complaints made. Kept on bed clean, dry and comfortable. Call light 
within easy reach. Endorsed to the next shift.

## 2019-09-14 NOTE — NUR
SPOKE WITH CATHIE ELLIS REGARDING RESULT OF CT HEAD WITHOUT CONTRAST AND ORTHOSTATIC BP 
MEASURING. PER CATHIE CONTINUE TO DC IV FLUIDS. PATIENT IN NO ACUTE DISTRESS. WILL CONTINUE 
TO MONITOR AND FOLLOW THROUGH WITH ORDERS.

## 2019-09-14 NOTE — NUR
MS RN OPENING NOTE



PATIENT IN BED RESTING COMFORTABLY. PATIENT BREATHING IS EVEN AND UNLABORED. PATIENT IN NO 
ACUTE DISTRESS. NO SOB NOTED. IV INTACT. HOB IS ELEVATED. SAFETY PRECAUTIONS IN PLACE. 
PATIENT BED IS LOCKED AND IN LOWEST POSITION. CALL LIGHT WITHIN REACH. WILL CONTINUE TO 
MONITOR.

## 2019-09-14 NOTE — NUR
received pt in bed, asleep, arousable ,no acute distress, denies any pain, only feeling tire 
and not slept for few days. kept on oxygen 2 liters, all needs attended. call light at 
reached, heplock intact and patent.

## 2019-09-14 NOTE — NUR
MS RN CLOSING NOTE



PATIENT IN BED , RESTING COMFORTABLY. PATIENT IN NO ACUTE DISTRESS. PATIENT BREATHING IS 
EVEN WITH MINIMAL LABORED BREATHING. PATIENT ON OXYGEN NC AT 2L SATURATING >96% SPO2. 
PATIENT IV INTACT. PATIENT ABLE TO VERBALIZE NEEDS, NEEDS AND CONCERNS WERE ADDRESSED 
THROUGHOUT SHIFT. PATIENT KEPT CLEAN, DRY, AND COMFORTABLE THROUGHOUT SHIFT. PATIENT 
EXTREMITIES OFFLOADED ON PILLOWS. SAFETY PRECAUTIONS IN PLACE. HOB IS ELEVATED. PATIENT BED 
IS LOCKED AND IN LOWEST POSITION. CALL LIGHT WITHIN REACH. WILL ENDORSE CARE TO PM SHIFT FOR 
NICOLAS.

## 2019-09-14 NOTE — NUR
MS RN NOTE



PATIENT WALKED WITH PT. PER PATIENT STATED THAT SHE HAD A BRIEF MOMENT OF LAPSE IN VISION 
WITH SHORTNESS OF BREATH. VITAL SIGNS TAKEN, WNL. PATIENT SATURATING 99% SPO2. PATIENT NOW 
RESTING IN BED, WITH MINIMAL LABORED BREATHING ON OXYGEN NC 2L. NOTIFIED CATHIE ELLIS. PER 
CATHIE ORDER FOR HEAD CT WITH OUT CONTRAST AND ORTHOSTATIC BP MONITOR.

## 2019-09-15 VITALS — DIASTOLIC BLOOD PRESSURE: 65 MMHG | SYSTOLIC BLOOD PRESSURE: 117 MMHG

## 2019-09-15 VITALS — DIASTOLIC BLOOD PRESSURE: 67 MMHG | SYSTOLIC BLOOD PRESSURE: 127 MMHG

## 2019-09-15 VITALS — SYSTOLIC BLOOD PRESSURE: 140 MMHG | DIASTOLIC BLOOD PRESSURE: 88 MMHG

## 2019-09-15 LAB
BASOPHILS # BLD AUTO: 0 /CMM (ref 0–0.2)
BASOPHILS NFR BLD AUTO: 0.1 % (ref 0–2)
BUN SERPL-MCNC: 15 MG/DL (ref 7–18)
CALCIUM SERPL-MCNC: 8.6 MG/DL (ref 8.5–10.1)
CHLORIDE SERPL-SCNC: 105 MMOL/L (ref 98–107)
CO2 SERPL-SCNC: 26 MMOL/L (ref 21–32)
CREAT SERPL-MCNC: 0.8 MG/DL (ref 0.6–1.3)
EOSINOPHIL NFR BLD AUTO: 0 % (ref 0–6)
GLUCOSE SERPL-MCNC: 130 MG/DL (ref 74–106)
HCT VFR BLD AUTO: 40 % (ref 33–45)
HGB BLD-MCNC: 12.8 G/DL (ref 11.5–14.8)
LYMPHOCYTES NFR BLD AUTO: 1 /CMM (ref 0.8–4.8)
LYMPHOCYTES NFR BLD AUTO: 9.2 % (ref 20–44)
MCHC RBC AUTO-ENTMCNC: 32 G/DL (ref 31–36)
MCV RBC AUTO: 92 FL (ref 82–100)
MONOCYTES NFR BLD AUTO: 0.5 /CMM (ref 0.1–1.3)
MONOCYTES NFR BLD AUTO: 4.3 % (ref 2–12)
NEUTROPHILS # BLD AUTO: 9.6 /CMM (ref 1.8–8.9)
NEUTROPHILS NFR BLD AUTO: 86.4 % (ref 43–81)
PLATELET # BLD AUTO: 339 /CMM (ref 150–450)
POTASSIUM SERPL-SCNC: 4.3 MMOL/L (ref 3.5–5.1)
RBC # BLD AUTO: 4.32 MIL/UL (ref 4–5.2)
SODIUM SERPL-SCNC: 140 MMOL/L (ref 136–145)
WBC NRBC COR # BLD AUTO: 11.1 K/UL (ref 4.3–11)

## 2019-09-15 RX ADMIN — Medication SCH EACH: at 12:29

## 2019-09-15 RX ADMIN — LOSARTAN POTASSIUM SCH MG: 25 TABLET, FILM COATED ORAL at 08:35

## 2019-09-15 RX ADMIN — ATORVASTATIN CALCIUM SCH MG: 40 TABLET, FILM COATED ORAL at 17:11

## 2019-09-15 RX ADMIN — Medication SCH MG: at 07:37

## 2019-09-15 RX ADMIN — ALBUTEROL SULFATE SCH MG: 2.5 SOLUTION RESPIRATORY (INHALATION) at 14:20

## 2019-09-15 RX ADMIN — FLUTICASONE PROPIONATE SCH SPRAY: 50 SPRAY, METERED NASAL at 08:36

## 2019-09-15 RX ADMIN — ALBUTEROL SULFATE SCH MG: 2.5 SOLUTION RESPIRATORY (INHALATION) at 19:30

## 2019-09-15 RX ADMIN — ALBUTEROL SULFATE SCH MG: 2.5 SOLUTION RESPIRATORY (INHALATION) at 07:37

## 2019-09-15 RX ADMIN — FLUTICASONE PROPIONATE SCH SPRAY: 50 SPRAY, METERED NASAL at 17:12

## 2019-09-15 RX ADMIN — AMLODIPINE BESYLATE SCH MG: 10 TABLET ORAL at 08:35

## 2019-09-15 RX ADMIN — Medication SCH EACH: at 08:15

## 2019-09-15 RX ADMIN — Medication PRN EACH: at 08:43

## 2019-09-15 RX ADMIN — INSULIN HUMAN PRN UNIT: 100 INJECTION, SOLUTION PARENTERAL at 17:13

## 2019-09-15 RX ADMIN — INSULIN HUMAN PRN UNIT: 100 INJECTION, SOLUTION PARENTERAL at 12:36

## 2019-09-15 RX ADMIN — Medication PRN EACH: at 14:49

## 2019-09-15 RX ADMIN — Medication SCH EACH: at 22:15

## 2019-09-15 RX ADMIN — LORAZEPAM PRN MG: 1 TABLET ORAL at 12:36

## 2019-09-15 RX ADMIN — Medication SCH EACH: at 17:12

## 2019-09-15 RX ADMIN — Medication SCH MG: at 14:20

## 2019-09-15 RX ADMIN — PAROXETINE HYDROCHLORIDE SCH MG: 10 TABLET, FILM COATED ORAL at 09:01

## 2019-09-15 RX ADMIN — INSULIN HUMAN PRN UNIT: 100 INJECTION, SOLUTION PARENTERAL at 22:16

## 2019-09-15 RX ADMIN — Medication SCH MG: at 19:30

## 2019-09-15 NOTE — NUR
MS RN NOTES



RECEIVED PATIENT IN THE BED ASLEEP BUT EASILY AWOKEN VERBALLY RO BY TOUCH.  A/O X4 AND ABLE 
TO MAKE NEEDS KNOWN.  RESPIRATIONS EVEN AND UNLABORED WITH NO S/S OF ACUTE DISTRESS OR SOB 
NOTED.  NO COMPLAINTS OF PAIN AT THIS TIME.  PT WITH IV ACCESS ON RIGHT HAND, PATENT AND 
INTACT.  SAFETY MEASURES IN PLACE WITH BED IN LOWEST LOCKED POSITION WITH SIDE RAILS UP X2.  
CALL LIGHT WITHIN REACH.  WILL CONTINUE TO MONITOR.

## 2019-09-15 NOTE — NUR
RN NOTES

 ADMINISTERED ATIVAN  1 MG PO PRN FOR ANXIETY PER PATIENT REQUEST V/S TAKEN BP-115/72, P-80, 
CONTINUED MONITORING. ALSO ADMINISTERED SCHEDULED MEDICATION.

## 2019-09-15 NOTE — NUR
RN NOTES

PATIENT STABLE RESTING IN THE BED, REFUSED PAIN, ADMINISTERED SCHEDULED MEDICATION, BS-195 
MG/DL, COVERAGE GIVEN, CALL LIGHT WITHIN TO REACH, SAFETY PRECAUTION MAINTAINED ALL THE 
TIME, ENDORSED ONCOMING NURSE FOLLOW PLAN OF CARE.

## 2019-09-15 NOTE — NUR
RN  NOTES

 ADMINISTERED NARCO 5/325 MG PO PRN  FOR GENERALIZED PAIN  8/10 PER  PATIENT REQUEST V/S 
TAKEN /67, P-81,  ALSO PATIENT  VERY ANXIOUS, CALL LIGHT WITHIN TO REACH. SAFETY 
PRECAUTION MAINTAINED ALL THE TIME.

## 2019-09-15 NOTE — NUR
RN NOTES

 MEDICATION WERE ADMINISTERED FOR PAIN EFFECTIVE PATIENT RESTING IN THE FUAD OF THE BED, V/S 
STABLE /67, P-92. CONTINUED MONITORING

## 2019-09-15 NOTE — NUR
pt called complaining of anxiety , stated that her "bipolar self is going to explode" she 
takes paxil 10 mg  at home and is not reconciled during this admission. page Epic, DR. James and made aware, pt vss, bp 118/62 hr 66 spo2 100 on 2 liters oxygen. awaiting for 
call back. pt encourage depp breathing to help her relax while awaiting for md call back.

## 2019-09-15 NOTE — NUR
RN NOTES

 RECEIVED PATIENT IN THE BED A/O X4. PATIENT ANXIOUS, BECAUSE SINCE YESTERDAY NOT TAKEN 
PSYCH MEDICATION AND HAVE NOT FAMILY TO GET MEDICATION FROM HOME. NOTIFIED NP CATHIE  AND GET 
PAXIL 10 MG PO DAILY AND ANXIETY ATIVAN,  ALSO PATIENT WAS COMPLAINING OF GENERALIZED PAIN, 
V/S TAKEN STABLE. PATIENT AMBULATORY. PATIENT  SOB WERE GETTING BREATHING Tx BY RT, IV 
ACCESS ON RIGHT HAND INTACT. NEEDS ATTENDED  AND ANTICIPATED, CALL LIGHT WITHIN TO REACH, 
SAFETY PRECAUTION MAINTAINED  ALL THE TIME.

## 2019-09-16 VITALS — DIASTOLIC BLOOD PRESSURE: 80 MMHG | SYSTOLIC BLOOD PRESSURE: 139 MMHG

## 2019-09-16 VITALS — SYSTOLIC BLOOD PRESSURE: 139 MMHG | DIASTOLIC BLOOD PRESSURE: 80 MMHG

## 2019-09-16 LAB
BASOPHILS # BLD AUTO: 0 /CMM (ref 0–0.2)
BASOPHILS NFR BLD AUTO: 0.1 % (ref 0–2)
BUN SERPL-MCNC: 17 MG/DL (ref 7–18)
CALCIUM SERPL-MCNC: 8.5 MG/DL (ref 8.5–10.1)
CHLORIDE SERPL-SCNC: 107 MMOL/L (ref 98–107)
CO2 SERPL-SCNC: 26 MMOL/L (ref 21–32)
CREAT SERPL-MCNC: 0.9 MG/DL (ref 0.6–1.3)
EOSINOPHIL NFR BLD AUTO: 0 % (ref 0–6)
GLUCOSE SERPL-MCNC: 135 MG/DL (ref 74–106)
HCT VFR BLD AUTO: 40 % (ref 33–45)
HGB BLD-MCNC: 13.1 G/DL (ref 11.5–14.8)
LYMPHOCYTES NFR BLD AUTO: 1.5 /CMM (ref 0.8–4.8)
LYMPHOCYTES NFR BLD AUTO: 12.7 % (ref 20–44)
MCHC RBC AUTO-ENTMCNC: 32 G/DL (ref 31–36)
MCV RBC AUTO: 92 FL (ref 82–100)
MONOCYTES NFR BLD AUTO: 0.7 /CMM (ref 0.1–1.3)
MONOCYTES NFR BLD AUTO: 6.4 % (ref 2–12)
NEUTROPHILS # BLD AUTO: 9.2 /CMM (ref 1.8–8.9)
NEUTROPHILS NFR BLD AUTO: 80.8 % (ref 43–81)
PLATELET # BLD AUTO: 333 /CMM (ref 150–450)
POTASSIUM SERPL-SCNC: 4.1 MMOL/L (ref 3.5–5.1)
RBC # BLD AUTO: 4.41 MIL/UL (ref 4–5.2)
SODIUM SERPL-SCNC: 144 MMOL/L (ref 136–145)
WBC NRBC COR # BLD AUTO: 11.4 K/UL (ref 4.3–11)

## 2019-09-16 RX ADMIN — Medication SCH MG: at 09:01

## 2019-09-16 RX ADMIN — Medication SCH EACH: at 12:19

## 2019-09-16 RX ADMIN — INSULIN HUMAN PRN UNIT: 100 INJECTION, SOLUTION PARENTERAL at 06:31

## 2019-09-16 RX ADMIN — ALBUTEROL SULFATE SCH MG: 2.5 SOLUTION RESPIRATORY (INHALATION) at 09:01

## 2019-09-16 RX ADMIN — LOSARTAN POTASSIUM SCH MG: 25 TABLET, FILM COATED ORAL at 08:17

## 2019-09-16 RX ADMIN — LORAZEPAM PRN MG: 1 TABLET ORAL at 03:42

## 2019-09-16 RX ADMIN — Medication PRN EACH: at 01:22

## 2019-09-16 RX ADMIN — Medication SCH EACH: at 06:33

## 2019-09-16 RX ADMIN — INSULIN HUMAN PRN UNIT: 100 INJECTION, SOLUTION PARENTERAL at 12:18

## 2019-09-16 RX ADMIN — PAROXETINE HYDROCHLORIDE SCH MG: 10 TABLET, FILM COATED ORAL at 08:16

## 2019-09-16 RX ADMIN — FLUTICASONE PROPIONATE SCH SPRAY: 50 SPRAY, METERED NASAL at 08:16

## 2019-09-16 RX ADMIN — AMLODIPINE BESYLATE SCH MG: 10 TABLET ORAL at 08:17

## 2019-09-16 NOTE — NUR
RN MS CLOSING/ DISCHARGE NOTES



Patient discharged on room air, no sob noted, no pain at this time.  Patient has all the 
paper work for discharge signed and in her possession.  IV removed with no bleeding noted.  
Patient stated that she will go to her PCP and get a blood sugar machine so she can check 
her blood consistently.  Patient going home with family member to drive her back home.

## 2019-09-16 NOTE — NUR
RN MS OPENING NOTES



Patient received on 2L nasal cannula, no sob noted, patient denies pain at this time.  
Patient remains a/o x3 with Right hand #20 SL.  Patient able to verbally make needs known, 
bed at the lowest setting, call light within reach, side rails up x2.

## 2022-02-25 NOTE — PD.PPDC
OB/GYN Discharge Instruction


Condition


Patient Condition:  Good





Diet


Diet:  Resume Regular Diet





Activity/Restrictions








Activity:   Normal Activity





 May Shower














Restrictions:   No Exercising





 No Lifting





 No Driving





 No Sexual Activity





 Nothing in the Vagina





 No [place]





 No Tampons, douche











Follow-up


Follow-up with Physician:  2, Week/Weeks





Return to clinic for








GYN Instructions:   Fever greater than 101





 Chills





 Worsening abdominal pain





 Excessive Vaginal Bleeding





 More than 2 pads per hour





 Unable to tolerate diet

















DEMAR SAVAGE MD Aug 28, 2017 08:45 24.4